# Patient Record
Sex: MALE | Race: WHITE | NOT HISPANIC OR LATINO | Employment: UNEMPLOYED | URBAN - METROPOLITAN AREA
[De-identification: names, ages, dates, MRNs, and addresses within clinical notes are randomized per-mention and may not be internally consistent; named-entity substitution may affect disease eponyms.]

---
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
## 2023-03-14 ENCOUNTER — APPOINTMENT (OUTPATIENT)
Dept: GENERAL RADIOLOGY | Facility: HOSPITAL | Age: 18
End: 2023-03-14
Payer: MEDICAID

## 2023-03-14 ENCOUNTER — HOSPITAL ENCOUNTER (EMERGENCY)
Facility: HOSPITAL | Age: 18
Discharge: HOME | End: 2023-03-14
Attending: EMERGENCY MEDICINE | Admitting: EMERGENCY MEDICINE
Payer: MEDICAID

## 2023-03-14 VITALS
DIASTOLIC BLOOD PRESSURE: 79 MMHG | HEART RATE: 87 BPM | HEIGHT: 72 IN | TEMPERATURE: 99 F | BODY MASS INDEX: 26.41 KG/M2 | WEIGHT: 195 LBS | SYSTOLIC BLOOD PRESSURE: 135 MMHG | RESPIRATION RATE: 20 BRPM | OXYGEN SATURATION: 99 %

## 2023-03-14 DIAGNOSIS — M79.672 LEFT FOOT PAIN: Primary | ICD-10-CM

## 2023-03-14 PROCEDURE — 73630 X-RAY EXAM OF FOOT: CPT

## 2023-03-14 PROCEDURE — 99283 EMERGENCY DEPT VISIT LOW MDM: CPT

## 2023-03-15 NOTE — ED PROVIDER NOTES
Subjective   History of Present Illness  18-year-old male past medical history significant for ADHD and asthma presents emergency room complaining of left foot pain.  Patient states that approximately 10 AM this morning while at work box that he was lifting fell and landed on his left foot.  He was able to ambulate continue working his shift but once he got home he decided to come the emergency room because his foot is swelling and hurts to flex and extend.  Patient has no other complaints.        Review of Systems   Constitutional: Negative for activity change, appetite change, chills, diaphoresis, fatigue and fever.   HENT: Negative for congestion, sinus pressure, sneezing and sore throat.    Eyes: Negative for photophobia and visual disturbance.   Respiratory: Negative for cough and shortness of breath.    Cardiovascular: Negative for chest pain, palpitations and leg swelling.   Gastrointestinal: Negative for abdominal distention, abdominal pain, diarrhea, nausea and vomiting.   Genitourinary: Negative for dysuria and flank pain.   Musculoskeletal: Negative for arthralgias, back pain and myalgias.        Left foot pain   Skin: Negative for rash.   Neurological: Negative for dizziness, weakness and headaches.   Psychiatric/Behavioral: Negative for behavioral problems and confusion.       Past Medical History:   Diagnosis Date   • ADHD (attention deficit hyperactivity disorder)     mood disorder   • ADHD (attention deficit hyperactivity disorder)     mood disorder   • Asthma        Allergies   Allergen Reactions   • Methylphenidate Mental Status Change     Suicidal thoughts   • Latex Rash     Other reaction(s): rash       History reviewed. No pertinent surgical history.    Family History   Problem Relation Age of Onset   • Diabetes Maternal Aunt    • Diabetes Maternal Uncle    • Diabetes Maternal Grandmother    • Diabetes Maternal Grandfather    • Heart disease Paternal Grandfather        Social History      Socioeconomic History   • Marital status: Single   Tobacco Use   • Smoking status: Every Day     Types: Cigarettes   • Smokeless tobacco: Never   • Tobacco comments:     less then half pack per day    Vaping Use   • Vaping Use: Never used   Substance and Sexual Activity   • Alcohol use: No   • Drug use: Never   • Sexual activity: Defer           Objective   Physical Exam  Constitutional:       General: He is not in acute distress.     Appearance: Normal appearance. He is not toxic-appearing or diaphoretic.   HENT:      Head: Normocephalic and atraumatic.      Mouth/Throat:      Mouth: Mucous membranes are moist.   Eyes:      Conjunctiva/sclera: Conjunctivae normal.   Cardiovascular:      Rate and Rhythm: Normal rate and regular rhythm.      Pulses: Normal pulses.   Pulmonary:      Effort: Pulmonary effort is normal. No respiratory distress.      Breath sounds: Normal breath sounds. No wheezing or rales.   Abdominal:      General: Abdomen is flat. There is no distension.      Tenderness: There is no abdominal tenderness.   Musculoskeletal:         General: No swelling or signs of injury. Normal range of motion.      Cervical back: Normal range of motion and neck supple.        Feet:    Feet:      Comments: Mild tenderness to palpation over left dorsal foot as seen on diagram.  Mild amount of swelling but no abrasion laceration contusion or hematoma noted.  Patient is neurovascular intact distally.  Patient has full range of motion of his ankle foot and toes.  Skin:     General: Skin is warm and dry.      Findings: No rash.   Neurological:      General: No focal deficit present.      Mental Status: He is alert and oriented to person, place, and time.   Psychiatric:         Mood and Affect: Mood normal.         Behavior: Behavior normal.         Procedures           ED Course  ED Course as of 03/14/23 2243 Tu Mar 14, 2023   2242 X-ray left foot:  IMPRESSION:  Negative left foot. []   2242 Discussed with  patient and mother x-ray findings and need for postop shoe anti-inflammatories as needed.  Patient states he understands agrees with plan.  Patient is asking for work note for tomorrow. []      ED Course User Index  [] Francisco Rojas MD                                           Medical Decision Making  My diagnosis for lower extremity pain and injury includes but is not limited to hip fracture, femur fracture, hip dislocation, hip contusion, hip sprain, hip strain, pelvic fracture, ischio-tibial band pain, ischio-tibial band bursitis, knee sprain, patella dislocation, knee dislocation, internal derangement of knee, fractures of the femur, tibia, fibula, ankle, foot and digits, ankle sprain, ankle dislocation, Lisfranc fracture, fracture dislocations of the digits, pulmonary embolism, claudication, peripheral vascular disease, gout, osteoarthritis, rheumatoid arthritis, bursitis, septic joint, poly-rheumatica, polyarthralgia and other inflammatory or infectious disease processes.    Amount and/or Complexity of Data Reviewed  Radiology: ordered. Decision-making details documented in ED Course.          Final diagnoses:   Left foot pain       ED Disposition  ED Disposition     ED Disposition   Discharge    Condition   Stable    Comment   --             PATIENT CONNECTION - [facility] [id number]  [phone]  Call       [facility]  [address] [facility] [address] [phone]  Go to   As needed         Medication List      No changes were made to your prescriptions during this visit.          Francisco Rojas MD  03/14/23 2241       Francisco Rojas MD  03/14/23 8520

## 2024-03-13 ENCOUNTER — HOSPITAL ENCOUNTER (EMERGENCY)
Facility: HOSPITAL | Age: 19
Discharge: HOME | End: 2024-03-13
Attending: EMERGENCY MEDICINE | Admitting: EMERGENCY MEDICINE
Payer: MEDICAID

## 2024-03-13 ENCOUNTER — APPOINTMENT (OUTPATIENT)
Dept: GENERAL RADIOLOGY | Facility: HOSPITAL | Age: 19
End: 2024-03-13
Payer: MEDICAID

## 2024-03-13 VITALS
WEIGHT: 184 LBS | RESPIRATION RATE: 18 BRPM | SYSTOLIC BLOOD PRESSURE: 143 MMHG | TEMPERATURE: 98.4 F | DIASTOLIC BLOOD PRESSURE: 89 MMHG | OXYGEN SATURATION: 100 % | HEIGHT: 71 IN | HEART RATE: 82 BPM | BODY MASS INDEX: 25.76 KG/M2

## 2024-03-13 DIAGNOSIS — S66.911A STRAIN OF RIGHT HAND, INITIAL ENCOUNTER: ICD-10-CM

## 2024-03-13 DIAGNOSIS — S63.91XA SPRAIN OF RIGHT HAND, INITIAL ENCOUNTER: Primary | ICD-10-CM

## 2024-03-13 PROCEDURE — 99283 EMERGENCY DEPT VISIT LOW MDM: CPT

## 2024-03-13 PROCEDURE — 73130 X-RAY EXAM OF HAND: CPT

## 2024-03-13 NOTE — DISCHARGE INSTRUCTIONS
Rest and increase fluids.  You may take ibuprofen for pain relief.  You may take 3 tablets every 6 hours or 4 tablets every 8 hours.  Ice and elevate as directed.  Follow-up with your primary care.
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
